# Patient Record
Sex: MALE | Race: WHITE | NOT HISPANIC OR LATINO | Employment: OTHER | URBAN - METROPOLITAN AREA
[De-identification: names, ages, dates, MRNs, and addresses within clinical notes are randomized per-mention and may not be internally consistent; named-entity substitution may affect disease eponyms.]

---

## 2023-03-31 DIAGNOSIS — Z00.00 ROUTINE GENERAL MEDICAL EXAMINATION AT A HEALTH CARE FACILITY: ICD-10-CM

## 2023-03-31 DIAGNOSIS — E78.5 ELEVATED LIPIDS: ICD-10-CM

## 2023-03-31 DIAGNOSIS — R74.8 ELEVATED LIVER ENZYMES: ICD-10-CM

## 2023-03-31 DIAGNOSIS — Z12.5 SCREENING PSA (PROSTATE SPECIFIC ANTIGEN): ICD-10-CM

## 2023-03-31 DIAGNOSIS — R09.89 LABILE BLOOD PRESSURE: ICD-10-CM

## 2023-03-31 DIAGNOSIS — E03.9 HYPOTHYROIDISM, UNSPECIFIED TYPE: ICD-10-CM

## 2023-03-31 DIAGNOSIS — R73.01 ELEVATED FASTING GLUCOSE: ICD-10-CM

## 2023-03-31 PROBLEM — M75.42 IMPINGEMENT SYNDROME OF LEFT SHOULDER: Status: ACTIVE | Noted: 2023-03-31

## 2023-03-31 PROBLEM — H61.21 IMPACTED CERUMEN OF RIGHT EAR: Status: RESOLVED | Noted: 2023-03-31 | Resolved: 2023-03-31

## 2023-03-31 PROBLEM — R00.2 PALPITATIONS: Status: ACTIVE | Noted: 2023-03-31

## 2023-03-31 PROBLEM — S90.222A SUBUNGUAL HEMATOMA OF TOE OF LEFT FOOT: Status: ACTIVE | Noted: 2023-03-31

## 2023-03-31 PROBLEM — I50.22 SYSTOLIC HEART FAILURE, CHRONIC (MULTI): Status: ACTIVE | Noted: 2023-03-31

## 2023-03-31 PROBLEM — I49.3 FREQUENT UNIFOCAL PVCS: Status: ACTIVE | Noted: 2023-03-31

## 2023-03-31 PROBLEM — D22.9 NEVUS: Status: ACTIVE | Noted: 2023-03-31

## 2023-03-31 PROBLEM — I72.3 ILIAC ARTERY ANEURYSM, BILATERAL (CMS-HCC): Status: ACTIVE | Noted: 2023-03-31

## 2023-03-31 PROBLEM — I42.9 CARDIOMYOPATHY (MULTI): Status: RESOLVED | Noted: 2023-03-31 | Resolved: 2023-03-31

## 2023-03-31 PROBLEM — I34.0 NONRHEUMATIC MITRAL VALVE REGURGITATION: Status: ACTIVE | Noted: 2023-03-31

## 2023-03-31 PROBLEM — H35.372 EPIRETINAL MEMBRANE, LEFT EYE: Status: ACTIVE | Noted: 2023-03-31

## 2023-03-31 PROBLEM — H60.91 OTITIS EXTERNA OF RIGHT EAR: Status: RESOLVED | Noted: 2023-03-31 | Resolved: 2023-03-31

## 2023-03-31 PROBLEM — L60.1 ONYCHOLYSIS OF TOENAIL: Status: ACTIVE | Noted: 2023-03-31

## 2023-03-31 PROBLEM — H25.13 AGE-RELATED NUCLEAR CATARACT OF BOTH EYES: Status: ACTIVE | Noted: 2023-03-31

## 2023-03-31 PROBLEM — D12.6 TUBULAR ADENOMA OF COLON: Status: ACTIVE | Noted: 2023-03-31

## 2023-03-31 PROBLEM — R06.02 SHORTNESS OF BREATH: Status: RESOLVED | Noted: 2023-03-31 | Resolved: 2023-03-31

## 2023-03-31 PROBLEM — M79.671 PAIN OF RIGHT HEEL: Status: RESOLVED | Noted: 2023-03-31 | Resolved: 2023-03-31

## 2023-03-31 PROBLEM — I42.0 CARDIOMYOPATHY, DILATED (MULTI): Status: ACTIVE | Noted: 2023-03-31

## 2023-03-31 PROBLEM — E78.00 HYPERCHOLESTEROLEMIA: Status: ACTIVE | Noted: 2023-03-31

## 2023-03-31 PROBLEM — E78.89 ELEVATED HIGH-DENSITY LIPOPROTEIN: Status: ACTIVE | Noted: 2023-03-31

## 2023-03-31 PROBLEM — I49.3 MULTIFOCAL PVCS: Status: RESOLVED | Noted: 2023-03-31 | Resolved: 2023-03-31

## 2023-03-31 RX ORDER — CARVEDILOL 3.12 MG/1
2 TABLET ORAL 2 TIMES DAILY
COMMUNITY
Start: 2022-11-17 | End: 2023-04-06 | Stop reason: ALTCHOICE

## 2023-03-31 RX ORDER — ASPIRIN 325 MG
1 TABLET, DELAYED RELEASE (ENTERIC COATED) ORAL DAILY
COMMUNITY
End: 2024-03-22 | Stop reason: ALTCHOICE

## 2023-03-31 RX ORDER — MULTIVITAMIN
1 TABLET ORAL DAILY
COMMUNITY

## 2023-03-31 RX ORDER — LISINOPRIL 5 MG/1
1 TABLET ORAL DAILY
COMMUNITY
Start: 2022-11-17 | End: 2024-04-17 | Stop reason: SDUPTHER

## 2023-03-31 RX ORDER — SPIRONOLACTONE 25 MG/1
1 TABLET ORAL DAILY
COMMUNITY
Start: 2022-11-17 | End: 2024-04-17 | Stop reason: SDUPTHER

## 2023-03-31 RX ORDER — LEVOTHYROXINE SODIUM 100 UG/1
1 TABLET ORAL DAILY
COMMUNITY
Start: 2013-09-23 | End: 2024-04-18

## 2023-04-03 ENCOUNTER — LAB (OUTPATIENT)
Dept: LAB | Facility: LAB | Age: 73
End: 2023-04-03
Payer: MEDICARE

## 2023-04-03 DIAGNOSIS — Z12.5 SCREENING PSA (PROSTATE SPECIFIC ANTIGEN): ICD-10-CM

## 2023-04-03 DIAGNOSIS — R74.8 ELEVATED LIVER ENZYMES: ICD-10-CM

## 2023-04-03 DIAGNOSIS — R73.01 ELEVATED FASTING GLUCOSE: ICD-10-CM

## 2023-04-03 DIAGNOSIS — Z00.00 ROUTINE GENERAL MEDICAL EXAMINATION AT A HEALTH CARE FACILITY: ICD-10-CM

## 2023-04-03 DIAGNOSIS — R09.89 LABILE BLOOD PRESSURE: ICD-10-CM

## 2023-04-03 DIAGNOSIS — E78.5 ELEVATED LIPIDS: ICD-10-CM

## 2023-04-03 DIAGNOSIS — E03.9 HYPOTHYROIDISM, UNSPECIFIED TYPE: ICD-10-CM

## 2023-04-03 LAB
ALANINE AMINOTRANSFERASE (SGPT) (U/L) IN SER/PLAS: 32 U/L (ref 10–52)
ALBUMIN (G/DL) IN SER/PLAS: 4 G/DL (ref 3.4–5)
ANION GAP IN SER/PLAS: 10 MMOL/L (ref 10–20)
APPEARANCE, URINE: CLEAR
ASPARTATE AMINOTRANSFERASE (SGOT) (U/L) IN SER/PLAS: 26 U/L (ref 9–39)
BASOPHILS (10*3/UL) IN BLOOD BY AUTOMATED COUNT: 0.04 X10E9/L (ref 0–0.1)
BASOPHILS/100 LEUKOCYTES IN BLOOD BY AUTOMATED COUNT: 1 % (ref 0–2)
BILIRUBIN, URINE: NEGATIVE
BLOOD, URINE: NEGATIVE
CALCIDIOL (25 OH VITAMIN D3) (NG/ML) IN SER/PLAS: 37 NG/ML
CALCIUM (MG/DL) IN SER/PLAS: 9.1 MG/DL (ref 8.6–10.6)
CARBON DIOXIDE, TOTAL (MMOL/L) IN SER/PLAS: 30 MMOL/L (ref 21–32)
CHLORIDE (MMOL/L) IN SER/PLAS: 101 MMOL/L (ref 98–107)
CHOLESTEROL (MG/DL) IN SER/PLAS: 243 MG/DL (ref 0–199)
CHOLESTEROL IN HDL (MG/DL) IN SER/PLAS: 63.7 MG/DL
CHOLESTEROL/HDL RATIO: 3.8
COLOR, URINE: YELLOW
CREATININE (MG/DL) IN SER/PLAS: 0.78 MG/DL (ref 0.5–1.3)
EOSINOPHILS (10*3/UL) IN BLOOD BY AUTOMATED COUNT: 0.12 X10E9/L (ref 0–0.4)
EOSINOPHILS/100 LEUKOCYTES IN BLOOD BY AUTOMATED COUNT: 2.9 % (ref 0–6)
ERYTHROCYTE DISTRIBUTION WIDTH (RATIO) BY AUTOMATED COUNT: 13.2 % (ref 11.5–14.5)
ERYTHROCYTE MEAN CORPUSCULAR HEMOGLOBIN CONCENTRATION (G/DL) BY AUTOMATED: 33.9 G/DL (ref 32–36)
ERYTHROCYTE MEAN CORPUSCULAR VOLUME (FL) BY AUTOMATED COUNT: 97 FL (ref 80–100)
ERYTHROCYTES (10*6/UL) IN BLOOD BY AUTOMATED COUNT: 3.99 X10E12/L (ref 4.5–5.9)
GFR MALE: >90 ML/MIN/1.73M2
GLUCOSE (MG/DL) IN SER/PLAS: 120 MG/DL (ref 74–99)
GLUCOSE, URINE: NEGATIVE MG/DL
HEMATOCRIT (%) IN BLOOD BY AUTOMATED COUNT: 38.7 % (ref 41–52)
HEMOGLOBIN (G/DL) IN BLOOD: 13.1 G/DL (ref 13.5–17.5)
IMMATURE GRANULOCYTES/100 LEUKOCYTES IN BLOOD BY AUTOMATED COUNT: 0.2 % (ref 0–0.9)
KETONES, URINE: NEGATIVE MG/DL
LDL: 155 MG/DL (ref 0–99)
LEUKOCYTE ESTERASE, URINE: NEGATIVE
LEUKOCYTES (10*3/UL) IN BLOOD BY AUTOMATED COUNT: 4.2 X10E9/L (ref 4.4–11.3)
LYMPHOCYTES (10*3/UL) IN BLOOD BY AUTOMATED COUNT: 1.39 X10E9/L (ref 0.8–3)
LYMPHOCYTES/100 LEUKOCYTES IN BLOOD BY AUTOMATED COUNT: 33 % (ref 13–44)
MONOCYTES (10*3/UL) IN BLOOD BY AUTOMATED COUNT: 0.39 X10E9/L (ref 0.05–0.8)
MONOCYTES/100 LEUKOCYTES IN BLOOD BY AUTOMATED COUNT: 9.3 % (ref 2–10)
NEUTROPHILS (10*3/UL) IN BLOOD BY AUTOMATED COUNT: 2.26 X10E9/L (ref 1.6–5.5)
NEUTROPHILS/100 LEUKOCYTES IN BLOOD BY AUTOMATED COUNT: 53.6 % (ref 40–80)
NITRITE, URINE: NEGATIVE
NRBC (PER 100 WBCS) BY AUTOMATED COUNT: 0 /100 WBC (ref 0–0)
PH, URINE: 7 (ref 5–8)
PHOSPHATE (MG/DL) IN SER/PLAS: 3.8 MG/DL (ref 2.5–4.9)
PLATELETS (10*3/UL) IN BLOOD AUTOMATED COUNT: 150 X10E9/L (ref 150–450)
POTASSIUM (MMOL/L) IN SER/PLAS: 4.9 MMOL/L (ref 3.5–5.3)
PROSTATE SPECIFIC AG (NG/ML) IN SER/PLAS: 0.75 NG/ML (ref 0–4)
PROTEIN, URINE: NEGATIVE MG/DL
SODIUM (MMOL/L) IN SER/PLAS: 136 MMOL/L (ref 136–145)
SPECIFIC GRAVITY, URINE: 1.01 (ref 1–1.03)
THYROTROPIN (MIU/L) IN SER/PLAS BY DETECTION LIMIT <= 0.05 MIU/L: 0.27 MIU/L (ref 0.44–3.98)
THYROXINE (T4) FREE (NG/DL) IN SER/PLAS: 1.72 NG/DL (ref 0.78–1.48)
TRIGLYCERIDE (MG/DL) IN SER/PLAS: 122 MG/DL (ref 0–149)
UREA NITROGEN (MG/DL) IN SER/PLAS: 14 MG/DL (ref 6–23)
UROBILINOGEN, URINE: <2 MG/DL (ref 0–1.9)
VLDL: 24 MG/DL (ref 0–40)

## 2023-04-03 PROCEDURE — 80061 LIPID PANEL: CPT

## 2023-04-03 PROCEDURE — 84450 TRANSFERASE (AST) (SGOT): CPT

## 2023-04-03 PROCEDURE — 81003 URINALYSIS AUTO W/O SCOPE: CPT

## 2023-04-03 PROCEDURE — 84443 ASSAY THYROID STIM HORMONE: CPT

## 2023-04-03 PROCEDURE — 84460 ALANINE AMINO (ALT) (SGPT): CPT

## 2023-04-03 PROCEDURE — 82306 VITAMIN D 25 HYDROXY: CPT

## 2023-04-03 PROCEDURE — 85025 COMPLETE CBC W/AUTO DIFF WBC: CPT

## 2023-04-03 PROCEDURE — G0103 PSA SCREENING: HCPCS

## 2023-04-03 PROCEDURE — 84439 ASSAY OF FREE THYROXINE: CPT

## 2023-04-03 PROCEDURE — 80069 RENAL FUNCTION PANEL: CPT

## 2023-04-03 PROCEDURE — 36415 COLL VENOUS BLD VENIPUNCTURE: CPT

## 2023-04-06 ENCOUNTER — OFFICE VISIT (OUTPATIENT)
Dept: PRIMARY CARE | Facility: CLINIC | Age: 73
End: 2023-04-06
Payer: MEDICARE

## 2023-04-06 ENCOUNTER — DOCUMENTATION (OUTPATIENT)
Dept: PRIMARY CARE | Facility: CLINIC | Age: 73
End: 2023-04-06

## 2023-04-06 DIAGNOSIS — I51.7 LAE (LEFT ATRIAL ENLARGEMENT): ICD-10-CM

## 2023-04-06 DIAGNOSIS — D12.6 TUBULAR ADENOMA OF COLON: ICD-10-CM

## 2023-04-06 DIAGNOSIS — E78.89 ELEVATED HIGH-DENSITY LIPOPROTEIN: ICD-10-CM

## 2023-04-06 DIAGNOSIS — R73.01 ELEVATED FASTING GLUCOSE: ICD-10-CM

## 2023-04-06 DIAGNOSIS — E78.5 ELEVATED LIPIDS: ICD-10-CM

## 2023-04-06 DIAGNOSIS — I42.0 CARDIOMYOPATHY, DILATED (MULTI): ICD-10-CM

## 2023-04-06 DIAGNOSIS — I34.0 NONRHEUMATIC MITRAL VALVE REGURGITATION: ICD-10-CM

## 2023-04-06 DIAGNOSIS — E03.8 OTHER SPECIFIED HYPOTHYROIDISM: ICD-10-CM

## 2023-04-06 DIAGNOSIS — Z00.00 ROUTINE GENERAL MEDICAL EXAMINATION AT A HEALTH CARE FACILITY: Primary | ICD-10-CM

## 2023-04-06 DIAGNOSIS — I49.3 FREQUENT UNIFOCAL PVCS: ICD-10-CM

## 2023-04-06 DIAGNOSIS — I50.22 SYSTOLIC HEART FAILURE, CHRONIC (MULTI): ICD-10-CM

## 2023-04-06 DIAGNOSIS — I50.22 CHRONIC SYSTOLIC (CONGESTIVE) HEART FAILURE (MULTI): ICD-10-CM

## 2023-04-06 PROCEDURE — G0439 PPPS, SUBSEQ VISIT: HCPCS | Performed by: INTERNAL MEDICINE

## 2023-04-06 PROCEDURE — 1170F FXNL STATUS ASSESSED: CPT | Performed by: INTERNAL MEDICINE

## 2023-04-06 PROCEDURE — 1159F MED LIST DOCD IN RCRD: CPT | Performed by: INTERNAL MEDICINE

## 2023-04-06 PROCEDURE — 1036F TOBACCO NON-USER: CPT | Performed by: INTERNAL MEDICINE

## 2023-04-06 PROCEDURE — 99214 OFFICE O/P EST MOD 30 MIN: CPT | Performed by: INTERNAL MEDICINE

## 2023-04-06 PROCEDURE — 1160F RVW MEDS BY RX/DR IN RCRD: CPT | Performed by: INTERNAL MEDICINE

## 2023-04-06 RX ORDER — LEVOTHYROXINE SODIUM 100 UG/1
100 TABLET ORAL
COMMUNITY
End: 2024-03-22 | Stop reason: ALTCHOICE

## 2023-04-06 RX ORDER — CARVEDILOL 12.5 MG/1
1 TABLET ORAL 2 TIMES DAILY
COMMUNITY
Start: 2023-02-10 | End: 2024-04-17 | Stop reason: SDUPTHER

## 2023-04-06 ASSESSMENT — ACTIVITIES OF DAILY LIVING (ADL)
BATHING: INDEPENDENT
DRESSING: INDEPENDENT

## 2023-04-06 ASSESSMENT — PATIENT HEALTH QUESTIONNAIRE - PHQ9
1. LITTLE INTEREST OR PLEASURE IN DOING THINGS: NOT AT ALL
SUM OF ALL RESPONSES TO PHQ9 QUESTIONS 1 AND 2: 0
2. FEELING DOWN, DEPRESSED OR HOPELESS: NOT AT ALL

## 2023-04-06 ASSESSMENT — ENCOUNTER SYMPTOMS
OCCASIONAL FEELINGS OF UNSTEADINESS: 0
DEPRESSION: 0

## 2023-04-06 NOTE — PROGRESS NOTES
Subjective   Reason for Visit: Wei Fitzgerald is an 72 y.o. male here for a Medicare Wellness visit.          Reviewed all medications by prescribing practitioner or clinical pharmacist (such as prescriptions, OTCs, herbal therapies and supplements) and documented in the medical record.    HPI    Patient Care Team:  Stan Perales MD as PCP - General  Stan Perales MD as PCP [place] [facility] [place]     Review of Systems    Objective   Vitals:  There were no vitals taken for this visit.      Physical Exam    Assessment/Plan   Problem List Items Addressed This Visit    None

## 2023-04-22 DIAGNOSIS — E03.9 HYPOTHYROIDISM, UNSPECIFIED TYPE: Primary | ICD-10-CM

## 2023-04-24 RX ORDER — LEVOTHYROXINE SODIUM 100 UG/1
TABLET ORAL
Qty: 90 TABLET | Refills: 3 | Status: SHIPPED | OUTPATIENT
Start: 2023-04-24 | End: 2024-03-22 | Stop reason: WASHOUT

## 2023-08-08 VITALS
SYSTOLIC BLOOD PRESSURE: 140 MMHG | WEIGHT: 160 LBS | RESPIRATION RATE: 16 BRPM | HEART RATE: 72 BPM | BODY MASS INDEX: 25.11 KG/M2 | HEIGHT: 67 IN | DIASTOLIC BLOOD PRESSURE: 70 MMHG

## 2023-08-08 PROBLEM — Z00.00 ROUTINE GENERAL MEDICAL EXAMINATION AT A HEALTH CARE FACILITY: Status: ACTIVE | Noted: 2023-08-08

## 2023-08-08 ASSESSMENT — ENCOUNTER SYMPTOMS
DYSPHORIC MOOD: 0
SLEEP DISTURBANCE: 0
RESPIRATORY NEGATIVE: 1
CARDIOVASCULAR NEGATIVE: 1
NEUROLOGICAL NEGATIVE: 1
ENDOCRINE NEGATIVE: 1
WEAKNESS: 0
HEADACHES: 0
NERVOUS/ANXIOUS: 0
DIZZINESS: 0
MUSCULOSKELETAL NEGATIVE: 1
SPEECH DIFFICULTY: 0
SORE THROAT: 0
GASTROINTESTINAL NEGATIVE: 1
TROUBLE SWALLOWING: 0
EYES NEGATIVE: 1
NUMBNESS: 0
CONFUSION: 0
HEMATOLOGIC/LYMPHATIC NEGATIVE: 1
VOICE CHANGE: 0

## 2023-08-08 ASSESSMENT — PATIENT HEALTH QUESTIONNAIRE - PHQ9
2. FEELING DOWN, DEPRESSED OR HOPELESS: NOT AT ALL
1. LITTLE INTEREST OR PLEASURE IN DOING THINGS: NOT AT ALL
SUM OF ALL RESPONSES TO PHQ9 QUESTIONS 1 AND 2: 0

## 2023-08-08 NOTE — PROGRESS NOTES
Subjective   Patient ID: Wei Fitzgerald is a 72 y.o. male who presents for Medicare Annual Wellness Visit Subsequent.  HPI  He feels well overall  Stable heart condition    Review of Systems   Constitutional:         Sleep 8 hours, weight 161 pounds stable     HENT:  Positive for tinnitus. Negative for dental problem, ear pain, hearing loss, sore throat, trouble swallowing and voice change.         Right ear buzz/ringing  Regular dental care  ENT consult 8/25/2022   Eyes: Negative.  Negative for visual disturbance.        Up to date   Respiratory: Negative.     Cardiovascular: Negative.    Gastrointestinal: Negative.         Mild flatus   Endocrine: Negative.    Genitourinary: Negative.    Musculoskeletal: Negative.  Negative for gait problem.   Skin: Negative.    Neurological: Negative.  Negative for dizziness, speech difficulty, weakness, numbness and headaches.   Hematological: Negative.    Psychiatric/Behavioral:  Negative for confusion, dysphoric mood and sleep disturbance. The patient is not nervous/anxious.        Objective   Physical Exam  Vitals reviewed.   Constitutional:       General: He is not in acute distress.     Appearance: Normal appearance. He is normal weight.   HENT:      Head: Normocephalic.      Right Ear: Tympanic membrane normal.      Left Ear: Tympanic membrane normal.      Ears:      Comments: Hearing intact to speech and finger rub  External surgical scar right ear     Nose: Nose normal.      Mouth/Throat:      Mouth: Mucous membranes are moist.      Pharynx: Oropharynx is clear.      Comments: Few missing teeth  Eyes:      General: No scleral icterus.     Extraocular Movements: Extraocular movements intact.      Conjunctiva/sclera: Conjunctivae normal.   Neck:      Vascular: No carotid bruit.      Comments: No JVD or thyromegaly  Cardiovascular:      Rate and Rhythm: Normal rate and regular rhythm.      Pulses: Normal pulses.      Heart sounds: Normal heart sounds. No murmur  "heard.  Pulmonary:      Effort: Pulmonary effort is normal.      Breath sounds: Normal breath sounds.   Abdominal:      General: Abdomen is flat. Bowel sounds are normal. There is no distension.      Palpations: Abdomen is soft. There is no mass.      Tenderness: There is no abdominal tenderness.      Hernia: No hernia is present.   Genitourinary:     Penis: Normal.       Testes: Normal.      Prostate: Normal.      Rectum: Normal.   Musculoskeletal:         General: Normal range of motion.      Cervical back: Normal range of motion and neck supple. No tenderness.   Skin:     General: Skin is warm.      Capillary Refill: Capillary refill takes less than 2 seconds.      Comments: Port wine stain left lower forearm   Neurological:      General: No focal deficit present.      Mental Status: He is alert and oriented to person, place, and time.      Cranial Nerves: No cranial nerve deficit.      Sensory: No sensory deficit.      Motor: No weakness.      Coordination: Coordination normal.      Gait: Gait normal.      Deep Tendon Reflexes: Reflexes normal.      Comments: Right-handed   Psychiatric:         Mood and Affect: Mood normal.         Behavior: Behavior normal.         Thought Content: Thought content normal.       /70   Pulse 72   Resp 16   Ht 1.702 m (5' 7\")   Wt 72.6 kg (160 lb)   BMI 25.06 kg/m²     Data  Lab 4/3/2023 glucose equals 120, GFR 90, total cholesterol 243, HDL 64, ; TSH 0.37, free T4 1.7  Cardiology consult 2/24/2023 reviewed  Echocardiogram 11/3/2022-normal sinus rhythm with frequent unifocal PVCs  Echocardiogram left atrial enlargement mitral valve regurgitation 2/7/2023  CONCLUSIONS:   1. Left ventricular systolic function is severely decreased with a 30-35% estimated ejection fraction.   2. Spectral Doppler shows an impaired relaxation pattern of left ventricular diastolic filling.   3. The left atrium is severely dilated.   4. Mild aortic valve stenosis.   5. Left ventricular " cavity size is moderately dilated.   6. There is global hypokinesis of the left ventricle with minor regional variations.  Coronary artery calcium CT score near 0 in 2017  Abdominal ultrasound October 2017 negative for aneurysm, 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  Colonoscopy 2017 diverticulosis and colon polyps  Stress echocardiogram 2005 normal    Assessment/Plan     1 overall you are in good health today, age and gender appropriate wellness services reviewed  2 you have no clinical evidence of active heart disease by history exam with indications of left atrial enlargement, cardiomyopathy or weakened heart muscle, aortic valve stenosis, and premature ventricular contractions  Recommend increase lisinopril to 10 mg for afterload reduction  Consider cholesterol therapy  Continue current therapy and cardiology follow-up  No abdominal aortic aneurysm but 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  Cardiac risk counseling  3 age and gender appropriate cancer screening and prevention reviewed, consider repeat colonoscopy  4 Immunizations reviewed, declined  5 hypothyroidism-suggest decrease Synthroid to 88 mcg daily  Problem List Items Addressed This Visit       Cardiomyopathy, dilated (CMS/HCC)    Relevant Medications    carvedilol (Coreg) 12.5 mg tablet    Other Relevant Orders    Lipid panel    Elevated fasting glucose - Primary    Relevant Orders    Hemoglobin A1c    Renal function panel    Elevated high-density lipoprotein    Elevated lipids    Hypothyroidism    Relevant Orders    Tsh With Reflex To Free T4 If Abnormal    Frequent unifocal PVCs    Relevant Medications    carvedilol (Coreg) 12.5 mg tablet    Nonrheumatic mitral valve regurgitation    Relevant Medications    carvedilol (Coreg) 12.5 mg tablet    Systolic heart failure, chronic (CMS/HCC)    Relevant Medications    carvedilol (Coreg) 12.5 mg tablet    Tubular adenoma of colon     Other Visit Diagnoses       LAE (left atrial enlargement)        Relevant Medications     carvedilol (Coreg) 12.5 mg tablet    Chronic systolic (congestive) heart failure (CMS/HCC)        Relevant Medications    carvedilol (Coreg) 12.5 mg tablet    Other Relevant Orders    Lipid panel

## 2023-09-07 ENCOUNTER — PATIENT MESSAGE (OUTPATIENT)
Dept: PRIMARY CARE | Facility: CLINIC | Age: 73
End: 2023-09-07

## 2023-10-26 ENCOUNTER — TELEPHONE (OUTPATIENT)
Dept: CARDIOLOGY | Facility: CLINIC | Age: 73
End: 2023-10-26
Payer: MEDICARE

## 2023-10-26 NOTE — TELEPHONE ENCOUNTER
Patient called to see if lab order was put in for his lipid panel. Please advise and I will call him back.

## 2023-10-30 ENCOUNTER — LAB (OUTPATIENT)
Dept: LAB | Facility: LAB | Age: 73
End: 2023-10-30
Payer: MEDICARE

## 2023-10-30 DIAGNOSIS — R73.01 ELEVATED FASTING GLUCOSE: ICD-10-CM

## 2023-10-30 DIAGNOSIS — I50.22 CHRONIC SYSTOLIC (CONGESTIVE) HEART FAILURE (MULTI): ICD-10-CM

## 2023-10-30 DIAGNOSIS — I42.0 CARDIOMYOPATHY, DILATED (MULTI): ICD-10-CM

## 2023-10-30 DIAGNOSIS — E03.8 OTHER SPECIFIED HYPOTHYROIDISM: ICD-10-CM

## 2023-10-30 LAB
ALBUMIN SERPL BCP-MCNC: 4 G/DL (ref 3.4–5)
ANION GAP SERPL CALC-SCNC: 12 MMOL/L (ref 10–20)
BUN SERPL-MCNC: 13 MG/DL (ref 6–23)
CALCIUM SERPL-MCNC: 9.2 MG/DL (ref 8.6–10.6)
CHLORIDE SERPL-SCNC: 101 MMOL/L (ref 98–107)
CHOLEST SERPL-MCNC: 231 MG/DL (ref 0–199)
CHOLESTEROL/HDL RATIO: 3.6
CO2 SERPL-SCNC: 28 MMOL/L (ref 21–32)
CREAT SERPL-MCNC: 0.72 MG/DL (ref 0.5–1.3)
EST. AVERAGE GLUCOSE BLD GHB EST-MCNC: 103 MG/DL
GFR SERPL CREATININE-BSD FRML MDRD: >90 ML/MIN/1.73M*2
GLUCOSE SERPL-MCNC: 112 MG/DL (ref 74–99)
HBA1C MFR BLD: 5.2 %
HDLC SERPL-MCNC: 64.2 MG/DL
LDLC SERPL CALC-MCNC: 146 MG/DL
NON HDL CHOLESTEROL: 167 MG/DL (ref 0–149)
PHOSPHATE SERPL-MCNC: 3.6 MG/DL (ref 2.5–4.9)
POTASSIUM SERPL-SCNC: 4.6 MMOL/L (ref 3.5–5.3)
SODIUM SERPL-SCNC: 136 MMOL/L (ref 136–145)
T4 FREE SERPL-MCNC: 1.65 NG/DL (ref 0.78–1.48)
TRIGL SERPL-MCNC: 106 MG/DL (ref 0–149)
TSH SERPL-ACNC: 0.22 MIU/L (ref 0.44–3.98)
VLDL: 21 MG/DL (ref 0–40)

## 2023-10-30 PROCEDURE — 80069 RENAL FUNCTION PANEL: CPT

## 2023-10-30 PROCEDURE — 36415 COLL VENOUS BLD VENIPUNCTURE: CPT

## 2023-10-30 PROCEDURE — 80061 LIPID PANEL: CPT

## 2023-10-30 PROCEDURE — 84439 ASSAY OF FREE THYROXINE: CPT

## 2023-10-30 PROCEDURE — 84443 ASSAY THYROID STIM HORMONE: CPT

## 2023-10-30 PROCEDURE — 83036 HEMOGLOBIN GLYCOSYLATED A1C: CPT

## 2024-03-13 ENCOUNTER — APPOINTMENT (OUTPATIENT)
Dept: CARDIOLOGY | Facility: CLINIC | Age: 74
End: 2024-03-13
Payer: MEDICARE

## 2024-03-22 ENCOUNTER — OFFICE VISIT (OUTPATIENT)
Dept: CARDIOLOGY | Facility: CLINIC | Age: 74
End: 2024-03-22
Payer: MEDICARE

## 2024-03-22 VITALS
OXYGEN SATURATION: 95 % | DIASTOLIC BLOOD PRESSURE: 84 MMHG | HEART RATE: 79 BPM | SYSTOLIC BLOOD PRESSURE: 126 MMHG | WEIGHT: 170 LBS | HEIGHT: 67 IN | BODY MASS INDEX: 26.68 KG/M2

## 2024-03-22 DIAGNOSIS — I50.22 SYSTOLIC HEART FAILURE, CHRONIC (MULTI): ICD-10-CM

## 2024-03-22 DIAGNOSIS — E03.9 HYPOTHYROIDISM, UNSPECIFIED TYPE: ICD-10-CM

## 2024-03-22 DIAGNOSIS — I42.0 CARDIOMYOPATHY, DILATED (MULTI): Primary | ICD-10-CM

## 2024-03-22 PROCEDURE — 1126F AMNT PAIN NOTED NONE PRSNT: CPT | Performed by: INTERNAL MEDICINE

## 2024-03-22 PROCEDURE — 1159F MED LIST DOCD IN RCRD: CPT | Performed by: INTERNAL MEDICINE

## 2024-03-22 PROCEDURE — 1036F TOBACCO NON-USER: CPT | Performed by: INTERNAL MEDICINE

## 2024-03-22 PROCEDURE — 99214 OFFICE O/P EST MOD 30 MIN: CPT | Performed by: INTERNAL MEDICINE

## 2024-03-22 ASSESSMENT — ENCOUNTER SYMPTOMS
OCCASIONAL FEELINGS OF UNSTEADINESS: 0
DEPRESSION: 1
LOSS OF SENSATION IN FEET: 1

## 2024-03-22 ASSESSMENT — PAIN SCALES - GENERAL: PAINLEVEL: 0-NO PAIN

## 2024-03-22 NOTE — PROGRESS NOTES
Name : Wei Fitzgerald   : 1950   MRN : [id number]   ENC Date : 2024      Assessment and Plan:  Cardiomyopathy: Near normal LVEF.  NYHA class I.  Tolerating medications well.  Recommend no medication changes.  Patient inquired if he could stop his medications and I encouraged him to continue to take them to prevent worsening LV function.  Patient does not meet criteria for implantable defibrillator.  Patient has not had an ischemic workup in the past however he has never had chest discomfort his LVEF improved.  His EKG does not suggest prior infarct.  His echocardiogram showed global cardiomyopathy and his coronary calcium score was only 5 in 2017.  At this point I think we can continue to manage him as a nonischemic cardiomyopathy.  Disp: RTO in 6 months    HPI:  Patient returns today feeling quite well.  His blood pressures at home are excellent.  He reports no lower extremity edema.  No dyspnea with exertion.  His main complaint is fatigue and tiredness in the middle of the day.  His home life is somewhat stressful.  No syncopal events.  No palpitations.  No TIA or CVA-like symptoms.    Problem list overview:   Patient Active Problem List   Diagnosis    Age-related nuclear cataract of both eyes    Cardiomyopathy, dilated (CMS/HCC)    Elevated fasting glucose    Elevated high-density lipoprotein    Elevated lipids    Epiretinal membrane, left eye    Hypercholesterolemia    Hypothyroidism    Iliac artery aneurysm, bilateral (CMS/HCC)    Impingement syndrome of left shoulder    Labile blood pressure    Frequent unifocal PVCs    Nevus    Nonrheumatic mitral valve regurgitation    Onycholysis of toenail    Palpitations    Subungual hematoma of toe of left foot    Systolic heart failure, chronic (CMS/HCC)    Tubular adenoma of colon    Elevated liver enzymes    Routine general medical examination at a health care facility       Meds:   Current Outpatient Medications on File Prior to Visit   Medication Sig  "Dispense Refill    carvedilol (Coreg) 12.5 mg tablet Take 1 tablet (12.5 mg) by mouth 2 times a day.      levothyroxine (Synthroid, Levoxyl) 100 mcg tablet Take 1 tablet (100 mcg) by mouth once daily.      lisinopril 5 mg tablet Take 1 tablet (5 mg) by mouth once daily.      multivitamin (Daily Multi-Vitamin) tablet Take 1 tablet by mouth once daily.      spironolactone (Aldactone) 25 mg tablet Take 1 tablet (25 mg) by mouth once daily.      [DISCONTINUED] levothyroxine (Synthroid, Levoxyl) 100 mcg tablet Take 1 tablet (100 mcg) by mouth once daily.      [DISCONTINUED] levothyroxine (Synthroid, Levoxyl) 100 mcg tablet TAKE 1 TABLET BY MOUTH EVERY DAY (Patient not taking: Reported on 3/22/2024) 90 tablet 3    [DISCONTINUED] omega-3 (Fish OiL) 60- mg capsule Take 1 capsule (500 mg) by mouth once daily.       No current facility-administered medications on file prior to visit.        VS:  /84 (BP Location: Left arm, Patient Position: Sitting)   Pulse 79   Ht 1.702 m (5' 7\")   Wt 77.1 kg (170 lb)   SpO2 95%   BMI 26.63 kg/m²     Vitals reviewed.   Neck:      Vascular: No JVD.   Pulmonary:      Breath sounds: Normal breath sounds.   Cardiovascular:      Normal rate. Regular rhythm.      Murmurs: There is no murmur.      No gallop.    Edema:     Peripheral edema absent.   Abdominal:      General: Abdomen is flat.      Palpations: Abdomen is soft.   Skin:     General: Skin is warm.         Ramon Mendez MD  "

## 2024-04-03 ENCOUNTER — TELEPHONE (OUTPATIENT)
Dept: PRIMARY CARE | Facility: CLINIC | Age: 74
End: 2024-04-03
Payer: MEDICARE

## 2024-04-05 DIAGNOSIS — I42.0 CARDIOMYOPATHY, DILATED (MULTI): ICD-10-CM

## 2024-04-05 DIAGNOSIS — Z00.00 ROUTINE GENERAL MEDICAL EXAMINATION AT A HEALTH CARE FACILITY: Primary | ICD-10-CM

## 2024-04-05 DIAGNOSIS — I50.22 CHRONIC SYSTOLIC HEART FAILURE (MULTI): ICD-10-CM

## 2024-04-05 DIAGNOSIS — E03.9 HYPOTHYROIDISM, UNSPECIFIED TYPE: ICD-10-CM

## 2024-04-05 DIAGNOSIS — I50.22 SYSTOLIC HEART FAILURE, CHRONIC (MULTI): ICD-10-CM

## 2024-04-08 ENCOUNTER — OFFICE VISIT (OUTPATIENT)
Dept: PRIMARY CARE | Facility: CLINIC | Age: 74
End: 2024-04-08
Payer: MEDICARE

## 2024-04-08 ENCOUNTER — APPOINTMENT (OUTPATIENT)
Dept: PRIMARY CARE | Facility: CLINIC | Age: 74
End: 2024-04-08
Payer: MEDICARE

## 2024-04-08 ENCOUNTER — LAB (OUTPATIENT)
Dept: LAB | Facility: LAB | Age: 74
End: 2024-04-08
Payer: MEDICARE

## 2024-04-08 VITALS
HEART RATE: 68 BPM | DIASTOLIC BLOOD PRESSURE: 74 MMHG | SYSTOLIC BLOOD PRESSURE: 120 MMHG | BODY MASS INDEX: 26.84 KG/M2 | WEIGHT: 171 LBS | HEIGHT: 67 IN | RESPIRATION RATE: 16 BRPM

## 2024-04-08 DIAGNOSIS — I72.3 ANEURYSM OF ILIAC ARTERY (CMS-HCC): ICD-10-CM

## 2024-04-08 DIAGNOSIS — Z00.00 ROUTINE GENERAL MEDICAL EXAMINATION AT HEALTH CARE FACILITY: ICD-10-CM

## 2024-04-08 DIAGNOSIS — G89.29 CHRONIC PAIN OF LEFT KNEE: ICD-10-CM

## 2024-04-08 DIAGNOSIS — I50.22 CHRONIC SYSTOLIC HEART FAILURE (MULTI): ICD-10-CM

## 2024-04-08 DIAGNOSIS — Z00.00 ROUTINE GENERAL MEDICAL EXAMINATION AT A HEALTH CARE FACILITY: ICD-10-CM

## 2024-04-08 DIAGNOSIS — E03.9 HYPOTHYROIDISM, UNSPECIFIED TYPE: ICD-10-CM

## 2024-04-08 DIAGNOSIS — E78.89 ELEVATED HIGH-DENSITY LIPOPROTEIN: ICD-10-CM

## 2024-04-08 DIAGNOSIS — D12.6 TUBULAR ADENOMA OF COLON: ICD-10-CM

## 2024-04-08 DIAGNOSIS — R53.82 CHRONIC FATIGUE: ICD-10-CM

## 2024-04-08 DIAGNOSIS — I42.0 CARDIOMYOPATHY, DILATED (MULTI): ICD-10-CM

## 2024-04-08 DIAGNOSIS — I35.0 NONRHEUMATIC AORTIC VALVE STENOSIS: ICD-10-CM

## 2024-04-08 DIAGNOSIS — M25.562 CHRONIC PAIN OF LEFT KNEE: ICD-10-CM

## 2024-04-08 DIAGNOSIS — I34.0 NONRHEUMATIC MITRAL VALVE REGURGITATION: ICD-10-CM

## 2024-04-08 DIAGNOSIS — Z00.00 ROUTINE GENERAL MEDICAL EXAMINATION AT A HEALTH CARE FACILITY: Primary | ICD-10-CM

## 2024-04-08 DIAGNOSIS — I51.7 LAE (LEFT ATRIAL ENLARGEMENT): ICD-10-CM

## 2024-04-08 PROBLEM — E25.0: Status: ACTIVE | Noted: 2024-04-08

## 2024-04-08 LAB
ALBUMIN SERPL BCP-MCNC: 4.2 G/DL (ref 3.4–5)
ALT SERPL W P-5'-P-CCNC: 23 U/L (ref 10–52)
ANION GAP SERPL CALC-SCNC: 10 MMOL/L (ref 10–20)
APPEARANCE UR: CLEAR
AST SERPL W P-5'-P-CCNC: 19 U/L (ref 9–39)
BASOPHILS # BLD AUTO: 0.04 X10*3/UL (ref 0–0.1)
BASOPHILS NFR BLD AUTO: 0.9 %
BILIRUB UR STRIP.AUTO-MCNC: NEGATIVE MG/DL
BUN SERPL-MCNC: 11 MG/DL (ref 6–23)
CALCIUM SERPL-MCNC: 9.5 MG/DL (ref 8.6–10.6)
CHLORIDE SERPL-SCNC: 103 MMOL/L (ref 98–107)
CHOLEST SERPL-MCNC: 234 MG/DL (ref 0–199)
CHOLESTEROL/HDL RATIO: 3.6
CO2 SERPL-SCNC: 29 MMOL/L (ref 21–32)
COLOR UR: NORMAL
CREAT SERPL-MCNC: 0.78 MG/DL (ref 0.5–1.3)
EGFRCR SERPLBLD CKD-EPI 2021: >90 ML/MIN/1.73M*2
EOSINOPHIL # BLD AUTO: 0.06 X10*3/UL (ref 0–0.4)
EOSINOPHIL NFR BLD AUTO: 1.3 %
ERYTHROCYTE [DISTWIDTH] IN BLOOD BY AUTOMATED COUNT: 12.7 % (ref 11.5–14.5)
EST. AVERAGE GLUCOSE BLD GHB EST-MCNC: 103 MG/DL
GLUCOSE SERPL-MCNC: 107 MG/DL (ref 74–99)
GLUCOSE UR STRIP.AUTO-MCNC: NORMAL MG/DL
HBA1C MFR BLD: 5.2 %
HCT VFR BLD AUTO: 43.1 % (ref 41–52)
HCV AB SER QL: NONREACTIVE
HDLC SERPL-MCNC: 64.3 MG/DL
HGB BLD-MCNC: 14.4 G/DL (ref 13.5–17.5)
IMM GRANULOCYTES # BLD AUTO: 0.01 X10*3/UL (ref 0–0.5)
IMM GRANULOCYTES NFR BLD AUTO: 0.2 % (ref 0–0.9)
KETONES UR STRIP.AUTO-MCNC: NEGATIVE MG/DL
LDLC SERPL CALC-MCNC: 149 MG/DL
LEUKOCYTE ESTERASE UR QL STRIP.AUTO: NEGATIVE
LYMPHOCYTES # BLD AUTO: 1.41 X10*3/UL (ref 0.8–3)
LYMPHOCYTES NFR BLD AUTO: 30.3 %
MCH RBC QN AUTO: 31.5 PG (ref 26–34)
MCHC RBC AUTO-ENTMCNC: 33.4 G/DL (ref 32–36)
MCV RBC AUTO: 94 FL (ref 80–100)
MONOCYTES # BLD AUTO: 0.37 X10*3/UL (ref 0.05–0.8)
MONOCYTES NFR BLD AUTO: 7.9 %
NEUTROPHILS # BLD AUTO: 2.77 X10*3/UL (ref 1.6–5.5)
NEUTROPHILS NFR BLD AUTO: 59.4 %
NITRITE UR QL STRIP.AUTO: NEGATIVE
NON HDL CHOLESTEROL: 170 MG/DL (ref 0–149)
NRBC BLD-RTO: 0 /100 WBCS (ref 0–0)
PH UR STRIP.AUTO: 7.5 [PH]
PHOSPHATE SERPL-MCNC: 2.9 MG/DL (ref 2.5–4.9)
PLATELET # BLD AUTO: 155 X10*3/UL (ref 150–450)
POTASSIUM SERPL-SCNC: 4.8 MMOL/L (ref 3.5–5.3)
PROT UR STRIP.AUTO-MCNC: NEGATIVE MG/DL
RBC # BLD AUTO: 4.57 X10*6/UL (ref 4.5–5.9)
RBC # UR STRIP.AUTO: NEGATIVE /UL
SODIUM SERPL-SCNC: 137 MMOL/L (ref 136–145)
SP GR UR STRIP.AUTO: 1.01
T4 FREE SERPL-MCNC: 1.73 NG/DL (ref 0.78–1.48)
TRIGL SERPL-MCNC: 106 MG/DL (ref 0–149)
TSH SERPL-ACNC: 0.4 MIU/L (ref 0.44–3.98)
UROBILINOGEN UR STRIP.AUTO-MCNC: NORMAL MG/DL
VLDL: 21 MG/DL (ref 0–40)
WBC # BLD AUTO: 4.7 X10*3/UL (ref 4.4–11.3)

## 2024-04-08 PROCEDURE — 83036 HEMOGLOBIN GLYCOSYLATED A1C: CPT

## 2024-04-08 PROCEDURE — 85025 COMPLETE CBC W/AUTO DIFF WBC: CPT

## 2024-04-08 PROCEDURE — 80061 LIPID PANEL: CPT

## 2024-04-08 PROCEDURE — 1170F FXNL STATUS ASSESSED: CPT | Performed by: INTERNAL MEDICINE

## 2024-04-08 PROCEDURE — 99214 OFFICE O/P EST MOD 30 MIN: CPT | Performed by: INTERNAL MEDICINE

## 2024-04-08 PROCEDURE — 86803 HEPATITIS C AB TEST: CPT

## 2024-04-08 PROCEDURE — 84460 ALANINE AMINO (ALT) (SGPT): CPT

## 2024-04-08 PROCEDURE — 1160F RVW MEDS BY RX/DR IN RCRD: CPT | Performed by: INTERNAL MEDICINE

## 2024-04-08 PROCEDURE — 84450 TRANSFERASE (AST) (SGOT): CPT

## 2024-04-08 PROCEDURE — 84402 ASSAY OF FREE TESTOSTERONE: CPT

## 2024-04-08 PROCEDURE — 84443 ASSAY THYROID STIM HORMONE: CPT

## 2024-04-08 PROCEDURE — 36415 COLL VENOUS BLD VENIPUNCTURE: CPT

## 2024-04-08 PROCEDURE — G0439 PPPS, SUBSEQ VISIT: HCPCS | Performed by: INTERNAL MEDICINE

## 2024-04-08 PROCEDURE — 1159F MED LIST DOCD IN RCRD: CPT | Performed by: INTERNAL MEDICINE

## 2024-04-08 PROCEDURE — 80069 RENAL FUNCTION PANEL: CPT

## 2024-04-08 PROCEDURE — 84439 ASSAY OF FREE THYROXINE: CPT

## 2024-04-08 PROCEDURE — 81003 URINALYSIS AUTO W/O SCOPE: CPT

## 2024-04-08 PROCEDURE — 93000 ELECTROCARDIOGRAM COMPLETE: CPT | Performed by: INTERNAL MEDICINE

## 2024-04-08 PROCEDURE — 1036F TOBACCO NON-USER: CPT | Performed by: INTERNAL MEDICINE

## 2024-04-08 NOTE — PROGRESS NOTES
"Subjective   Reason for Visit: Wei Fitzgerald is an 73 y.o. male here for a Medicare Wellness visit.     Past Medical, Surgical, and Family History reviewed and updated in chart.    Reviewed all medications by prescribing practitioner or clinical pharmacist (such as prescriptions, OTCs, herbal therapies and supplements) and documented in the medical record.    HPI  Candle burning down low energy   Normal daily activities   Depressed domestic/family ;  financial support   Friends loss death   100-120   no family history to report   [facility] visit 3/27/2022 and 4/6/2023    2-5 miles walking day   Sleep trouble falling reading consistent early awakening   Usual once   Diet -same     Dental OK \  Hearing ok no change   Eye due   12/22/21 pf c19  L knee Ptx  Nt in hands bed positional activity     Etoh 4 oz  Family History:   Heart Disease, Cancer+, Hypertension, Diabetes mellitus, Hyperlipidemia, Asthma,  Alcoholism or substance abuse, Depression/Suicide, Glaucoma   Mother dead 80s , natural causes   Father dead 76 prostate cancer    Brother(s) no   Sisters(s) no    Son(s) no   Daughter(s)no      Social History:   Birthplace: [facility] Education:3 yr college  :no   Marital Status:m x 33        Occupation: retired     Travel/Illness: [place] , [place]    Exposures (asbestos, radiation, radon, noise, etc.): thyroid    Lifestyle:    Diet limited fried food , low red meat , well balanced     Exercise + walks 2-4 miles daily     Ear protection from loud noise     Eye protection from projectiles (work, hobbies, sports)    Alcohol + daily vodka same 1.25 oz 3 - no health legal or social problems    Drugs -    Tobacco former , none x 21 yrs , 1-2 ppd x 5-10 yrs    Caffeine + coffee    Safety: guns-, seatbelts/airbags+30%, smoke detectors+    Sexual - aware of sexually transmitted disease and \"safe\" sexual practice     Skin protection from sun +    Swim +    Living " Will/Durable Power of  for Healthcare +    Cardiopulmonary resuscitation (CPR) training    Blood + or organ donor / Blood type     Patient Care Team:  Stan Perales MD as PCP - General  Stan Perales MD as PCP - [facility] [place]  Ramon Mendez MD as Consulting Physician (Cardiology)     Review of Systems  Weight Current 165 # Maximum  170s Desired  Change  In the past 6-12 weeks, have you experienced any significant ... Negative except for noted   Fever, chills, Night sweats, fatigue +, change in appetite, change in sleep-8 hours early awakening   Skin sore, rashes, itching, moles of concern-   Swollen or tender glands in neck, armpits, or groin   Unusually severe or prolonged headaches   Blurred, double, or loss of vision; last formal eye exam, with whom, reading glasses+/contacts-2022+   Loss or change in hearing, ear pain, ringing+constant buzz, or wax problem, hearing aid-right buzz   Nasal congestion, polyps, runny nose, sneezing   Sores in mouth, sore throat, change or trouble in chewing, speaking, swallowing, or voice   Dental or jaw problem, dentures, regular dental care with whom+ regular     Neck or back pain; swelling, redness, pain, or stiffness in joints; known arthritis   Chest pain/pressure or unusual shortness of breath with exertion, shortness of breath while lying down, swelling in legs, sensation of heart racing or skipping beats; unexplained nausea, or breaking into a cold sweat; feeling faint, lightheaded, or dizzy Unusual cough   Stomach or abdominal pain, nausea/vomiting, diarrhea/constipation, blood in or  dark black color to stool, other change in bowel habits-   Burning on urination, trouble getting stream started, frequent or nighttime urination+, blood in urine, loss of control of bladder; sex ok    Lumps or thickening in testicles or scrotum, sores or discharge from penis, swelling or hernias in groin   Weakness, numbness/tingling, especially on one side of the  "body or the other;  tremor, shakiness, loss of coordination, or falls; change in memory   Excessive or change in thirst, urination, or appetite; unusual sensitivity to the heat or cold   Unusual feelings of being blue, sad, down or depressed; stress, restlessness, tension or anxiety  Falls -     Objective   Vitals:  /74   Pulse 68   Resp 16   Ht 1.702 m (5' 7\")   Wt 77.6 kg (171 lb)   BMI 26.78 kg/m²       Physical Exam  General: well-developed, well-nourished middle-aged ambulatory white male in no acute distress  Head: normocephalic/atraumatic; temporal arteries intact, nontender; Temporalmandibular joints nontender without click  Eyes: EOMI, PERRLA, sclera white, conjunctiva pink, fundi not examined  Ears: Canals clear; TMs clear intact with normal landmarks, hearing intact to speech and finger rub; external surgical scar right ear; ear nontender  Nose: Nasal mucosa clear, pink, moist; no sinus tenderness  Oropharynx: Mucosa clear, pink, moist without lesions or exudate   Dentition intact good/ fair condition/repair, few missing teeth; right posterior upper tooth impaired   Tonsils atrophy/absent   Tongue midline, normal mucosa and protrusion   Normal motion of palate/uvula/pharynx  Neck: Supple, full range of motion; no lymphadenopathy; no JVD   Thyroid normal size, smooth to palpation/observation without nodules   Carotids normal pulse, upstroke without bruit  Back: Spine normal shape, curvature, flexibility, nontender   No CVA or SIJ tenderness  Chest: Normal symmetrical, full expansion with equal breath sounds without adventitious sounds; wall nontender  Cor: PMI normal; regular rate and rhythm; normal S1, S2 without adventitious sounds  Abdomen: Soft, nontender, benign without mass, hepatosplenomegaly, fluid   Bowels sounds present, normal  Genitourinary: Normal male phallus without lesion or exudate   Scrotum without masses; testes smooth, nontender   Inguinal canal without mass  Rectal: Normal " sphincter tone; no mass; stool formed, soft, brown, hemoccult negative   Prostate smooth, normal size, nontender, normal consistency  Extremities: Bones, Joints intact with FROM, T0 L0 S0   Pulses intact, symmetrical; no edema; venous system legs normal  Neurological: Mental status - alert, appropriate affect, normal orientation and conversational interaction; right handed dominant   Cranial Nerves: II-XII intact   Motor - 5/5 strength throughout muscle groups; normal tone, bulk   Sensory - intact symmetrical soft touch, sharp sense   DTRs - intact knee, ankle, brachial   Cerebellar - normal finger-nose; absent Rhomberg sign   Station/gait - intact, stable including toe, heel, and tandem   Integument: Skin - clear without rash or lesion, port-wine stain left forearm   Lymph - without cervical, axillary, or inguinal lymphadenopathy      Results/Data    Laboratory 10/30/2023 Free T4 =1.65, TSH 0.22  , HDL-C 64, ratio 3.6, LDL-C 146, glucose 112, creatinine 0.7 with GFR greater than 90  3/14/2022 satisfactory  January 2021 reviewed summary 2019 reviewed  December 2018 satisfactory with borderline high LDL cholesterol, high HDL cholesterol  PSA normal and less than last year  Coronary artery calcium CT score near 0 in 2017  Abdominal ultrasound - October 2017 for aneurysm aorta, 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  Laboratory October 2017 satisfactory with high HDL cholesterol  Cardiology consult 3/22/2024 reviewed  Orthopedic consult 5/24/2023 reviewed  Electrocardiogram sinus bradycardia/normal sinus rhythm 4/8/2024  normal sinus rhythm with isolated uniform PVCs 3/17/22  Echocardiogram 8/23/2023-LVEF 45%, left atrium severely dilated, mild aortic valve stenosis; LV function has improved from 2/7/2023 from 30-35%  X-ray left knee 5/15/2023 mild degenerative changes  Colonoscopy October 2017 diverticulosis and colon polyps including tubular adenoma  Stress echocardiogram 2005 normal      Assessment/Plan   1 overall  you are in good health today, age and gender appropriate wellness services reviewed  2 you have no clinical evidence of active heart disease by history exam with indications of left atrial enlargement, cardiomyopathy or weakened heart muscle, aortic valve stenosis, and premature ventricular contractions  Entresto for afterload reduction  Consider cholesterol therapy  Continue current therapy and cardiology follow-up reviewed  Cardiac risk counseling  3 age and gender appropriate cancer screening and prevention reviewed, recommend follow-up colonoscopy  4 Immunizations available reviewed, declined  5 hypothyroidism-recheck levels    Problem List Items Addressed This Visit       Cardiomyopathy (Multi)    Elevated high-density lipoprotein    Hypothyroid    Aneurysm of iliac artery (CMS-HCC)    Nonrheumatic mitral valve regurgitation    Tubular adenoma of colon    Routine general medical examination at a health care facility - Primary    Chronic fatigue    Relevant Orders    Testosterone, total and free (Completed)    LAE (left atrial enlargement)    Nonrheumatic aortic valve stenosis    Knee pain, left

## 2024-04-08 NOTE — PATIENT INSTRUCTIONS
1 overall you are in good health today, age and gender appropriate wellness services reviewed  2 you have no clinical evidence of active heart disease by history exam with indications of left atrial enlargement, cardiomyopathy or weakened heart muscle, aortic valve stenosis, and premature ventricular contractions  Entresto for afterload reduction  Consider cholesterol therapy  Continue current therapy and cardiology follow-up reviewed  Cardiac risk counseling  3 age and gender appropriate cancer screening and prevention reviewed  4 Immunizations reviewed, declined  5 hypothyroidism-recheck levels

## 2024-04-10 ENCOUNTER — PATIENT MESSAGE (OUTPATIENT)
Dept: PRIMARY CARE | Facility: CLINIC | Age: 74
End: 2024-04-10
Payer: MEDICARE

## 2024-04-12 LAB
TESTOSTERONE FREE (CHAN): 68.1 PG/ML (ref 30–135)
TESTOSTERONE,TOTAL,LC-MS/MS: 577 NG/DL (ref 250–1100)

## 2024-04-17 DIAGNOSIS — I42.0 DILATED CARDIOMYOPATHY (MULTI): ICD-10-CM

## 2024-04-17 DIAGNOSIS — I50.22 SYSTOLIC HEART FAILURE, CHRONIC (MULTI): ICD-10-CM

## 2024-04-17 RX ORDER — LISINOPRIL 5 MG/1
5 TABLET ORAL DAILY
Qty: 90 TABLET | Refills: 3 | Status: SHIPPED | OUTPATIENT
Start: 2024-04-17

## 2024-04-17 RX ORDER — CARVEDILOL 12.5 MG/1
12.5 TABLET ORAL 2 TIMES DAILY
Qty: 180 TABLET | Refills: 3 | Status: SHIPPED | OUTPATIENT
Start: 2024-04-17

## 2024-04-17 RX ORDER — SPIRONOLACTONE 25 MG/1
25 TABLET ORAL DAILY
Qty: 90 TABLET | Refills: 3 | OUTPATIENT
Start: 2024-04-17

## 2024-04-17 RX ORDER — LISINOPRIL 5 MG/1
5 TABLET ORAL DAILY
Qty: 90 TABLET | Refills: 3 | OUTPATIENT
Start: 2024-04-17

## 2024-04-17 RX ORDER — SPIRONOLACTONE 25 MG/1
25 TABLET ORAL DAILY
Qty: 90 TABLET | Refills: 3 | Status: SHIPPED | OUTPATIENT
Start: 2024-04-17

## 2024-04-17 NOTE — TELEPHONE ENCOUNTER
Called and spoke with pt, states Entresto was previously recommended/ordered by Liliana Hui, but pt never picked up or started taking it. Med list updated to reflect what pt is currently taking, please sign pended med renewals if appropriate. Thank you.

## 2024-04-17 NOTE — TELEPHONE ENCOUNTER
He cant be on lisinopril and entresto at the same time.    Can you clarify what he is actually taking?  SDH

## 2024-04-18 DIAGNOSIS — E03.9 HYPOTHYROIDISM, UNSPECIFIED TYPE: Primary | ICD-10-CM

## 2024-04-18 RX ORDER — LEVOTHYROXINE SODIUM 100 UG/1
100 TABLET ORAL DAILY
Qty: 90 TABLET | Refills: 1 | Status: SHIPPED | OUTPATIENT
Start: 2024-04-18

## 2024-04-21 PROBLEM — K57.90 DIVERTICULAR DISEASE: Status: ACTIVE | Noted: 2024-04-21

## 2024-04-21 PROBLEM — I49.3 MULTIFOCAL PVCS: Status: ACTIVE | Noted: 2023-03-31

## 2024-04-21 PROBLEM — I42.9 CARDIOMYOPATHY (MULTI): Status: ACTIVE | Noted: 2023-03-31

## 2024-04-21 PROBLEM — R06.02 SHORTNESS OF BREATH: Status: ACTIVE | Noted: 2023-03-31

## 2024-04-21 PROBLEM — Z86.0100 HISTORY OF COLONIC POLYPS: Status: ACTIVE | Noted: 2024-04-21

## 2024-04-21 PROBLEM — I51.7 LAE (LEFT ATRIAL ENLARGEMENT): Status: ACTIVE | Noted: 2024-04-21

## 2024-04-21 PROBLEM — M25.562 KNEE PAIN, LEFT: Status: ACTIVE | Noted: 2024-04-21

## 2024-04-21 PROBLEM — M17.12 PRIMARY LOCALIZED OSTEOARTHRITIS OF LEFT KNEE: Status: ACTIVE | Noted: 2024-04-21

## 2024-04-21 PROBLEM — R73.9 HYPERGLYCEMIA: Status: ACTIVE | Noted: 2023-03-31

## 2024-04-21 PROBLEM — H61.21 IMPACTED CERUMEN OF RIGHT EAR: Status: ACTIVE | Noted: 2021-02-24

## 2024-04-21 PROBLEM — Z86.010 HISTORY OF COLONIC POLYPS: Status: ACTIVE | Noted: 2024-04-21

## 2024-04-21 PROBLEM — I35.0 NONRHEUMATIC AORTIC VALVE STENOSIS: Status: ACTIVE | Noted: 2024-04-21

## 2024-04-21 ASSESSMENT — ACTIVITIES OF DAILY LIVING (ADL)
TAKING_MEDICATION: INDEPENDENT
MANAGING_FINANCES: INDEPENDENT
DOING_HOUSEWORK: INDEPENDENT
BATHING: INDEPENDENT
DRESSING: INDEPENDENT
GROCERY_SHOPPING: INDEPENDENT

## 2024-04-21 ASSESSMENT — PATIENT HEALTH QUESTIONNAIRE - PHQ9
2. FEELING DOWN, DEPRESSED OR HOPELESS: SEVERAL DAYS
1. LITTLE INTEREST OR PLEASURE IN DOING THINGS: NOT AT ALL
10. IF YOU CHECKED OFF ANY PROBLEMS, HOW DIFFICULT HAVE THESE PROBLEMS MADE IT FOR YOU TO DO YOUR WORK, TAKE CARE OF THINGS AT HOME, OR GET ALONG WITH OTHER PEOPLE: SOMEWHAT DIFFICULT
SUM OF ALL RESPONSES TO PHQ9 QUESTIONS 1 AND 2: 1

## 2024-10-12 DIAGNOSIS — E03.9 HYPOTHYROIDISM, UNSPECIFIED TYPE: ICD-10-CM

## 2024-10-14 RX ORDER — LEVOTHYROXINE SODIUM 100 UG/1
100 TABLET ORAL DAILY
Qty: 90 TABLET | Refills: 1 | Status: SHIPPED | OUTPATIENT
Start: 2024-10-14

## 2025-01-26 DIAGNOSIS — E03.9 HYPOTHYROIDISM, UNSPECIFIED TYPE: ICD-10-CM

## 2025-01-28 RX ORDER — LEVOTHYROXINE SODIUM 100 UG/1
100 TABLET ORAL DAILY
Qty: 90 TABLET | Refills: 1 | Status: SHIPPED | OUTPATIENT
Start: 2025-01-28

## 2025-04-04 DIAGNOSIS — I50.22 SYSTOLIC HEART FAILURE, CHRONIC: ICD-10-CM

## 2025-04-04 DIAGNOSIS — I42.0 DILATED CARDIOMYOPATHY (MULTI): ICD-10-CM

## 2025-04-04 RX ORDER — LISINOPRIL 5 MG/1
5 TABLET ORAL DAILY
Qty: 90 TABLET | Refills: 3 | Status: SHIPPED | OUTPATIENT
Start: 2025-04-04

## 2025-04-07 ENCOUNTER — PATIENT MESSAGE (OUTPATIENT)
Dept: PRIMARY CARE | Facility: CLINIC | Age: 75
End: 2025-04-07
Payer: MEDICARE

## 2025-04-07 DIAGNOSIS — R73.9 HYPERGLYCEMIA: Primary | ICD-10-CM

## 2025-04-07 DIAGNOSIS — R00.2 PALPITATIONS: ICD-10-CM

## 2025-04-07 DIAGNOSIS — R74.8 ELEVATED LIVER ENZYMES: ICD-10-CM

## 2025-04-07 DIAGNOSIS — R09.89 LABILE BLOOD PRESSURE: ICD-10-CM

## 2025-04-07 DIAGNOSIS — Z00.00 ROUTINE GENERAL MEDICAL EXAMINATION AT A HEALTH CARE FACILITY: ICD-10-CM

## 2025-04-07 DIAGNOSIS — E78.89 ELEVATED HIGH-DENSITY LIPOPROTEIN: ICD-10-CM

## 2025-04-07 DIAGNOSIS — E03.9 HYPOTHYROIDISM, UNSPECIFIED TYPE: ICD-10-CM

## 2025-04-08 DIAGNOSIS — I50.22 SYSTOLIC HEART FAILURE, CHRONIC: ICD-10-CM

## 2025-04-08 DIAGNOSIS — I42.0 DILATED CARDIOMYOPATHY (MULTI): ICD-10-CM

## 2025-04-08 RX ORDER — CARVEDILOL 12.5 MG/1
12.5 TABLET ORAL 2 TIMES DAILY
Qty: 180 TABLET | Refills: 1 | Status: SHIPPED | OUTPATIENT
Start: 2025-04-08

## 2025-04-08 RX ORDER — SPIRONOLACTONE 25 MG/1
25 TABLET ORAL DAILY
Qty: 90 TABLET | Refills: 1 | Status: SHIPPED | OUTPATIENT
Start: 2025-04-08

## 2025-04-12 LAB
ALBUMIN SERPL-MCNC: 4 G/DL (ref 3.6–5.1)
ALT SERPL-CCNC: 20 U/L (ref 9–46)
APPEARANCE UR: CLEAR
AST SERPL-CCNC: 19 U/L (ref 10–35)
BASOPHILS # BLD AUTO: 41 CELLS/UL (ref 0–200)
BASOPHILS NFR BLD AUTO: 1.1 %
BILIRUB UR QL STRIP: NEGATIVE
BUN SERPL-MCNC: 12 MG/DL (ref 7–25)
BUN/CREAT SERPL: ABNORMAL (CALC) (ref 6–22)
CALCIUM SERPL-MCNC: 9 MG/DL (ref 8.6–10.3)
CHLORIDE SERPL-SCNC: 104 MMOL/L (ref 98–110)
CHOLEST SERPL-MCNC: 249 MG/DL
CHOLEST/HDLC SERPL: 4.1 (CALC)
CO2 SERPL-SCNC: 24 MMOL/L (ref 20–32)
COLOR UR: YELLOW
CREAT SERPL-MCNC: 0.76 MG/DL (ref 0.7–1.28)
EGFRCR SERPLBLD CKD-EPI 2021: 94 ML/MIN/1.73M2
EOSINOPHIL # BLD AUTO: 100 CELLS/UL (ref 15–500)
EOSINOPHIL NFR BLD AUTO: 2.7 %
ERYTHROCYTE [DISTWIDTH] IN BLOOD BY AUTOMATED COUNT: 12.8 % (ref 11–15)
EST. AVERAGE GLUCOSE BLD GHB EST-MCNC: 108 MG/DL
EST. AVERAGE GLUCOSE BLD GHB EST-SCNC: 6 MMOL/L
GLUCOSE SERPL-MCNC: 114 MG/DL (ref 65–99)
GLUCOSE UR QL STRIP: NEGATIVE
HBA1C MFR BLD: 5.4 % OF TOTAL HGB
HCT VFR BLD AUTO: 42.9 % (ref 38.5–50)
HDLC SERPL-MCNC: 61 MG/DL
HGB BLD-MCNC: 14.3 G/DL (ref 13.2–17.1)
HGB UR QL STRIP: NEGATIVE
KETONES UR QL STRIP: NEGATIVE
LDLC SERPL CALC-MCNC: 166 MG/DL (CALC)
LEUKOCYTE ESTERASE UR QL STRIP: NEGATIVE
LYMPHOCYTES # BLD AUTO: 1310 CELLS/UL (ref 850–3900)
LYMPHOCYTES NFR BLD AUTO: 35.4 %
MCH RBC QN AUTO: 32.1 PG (ref 27–33)
MCHC RBC AUTO-ENTMCNC: 33.3 G/DL (ref 32–36)
MCV RBC AUTO: 96.4 FL (ref 80–100)
MONOCYTES # BLD AUTO: 440 CELLS/UL (ref 200–950)
MONOCYTES NFR BLD AUTO: 11.9 %
NEUTROPHILS # BLD AUTO: 1809 CELLS/UL (ref 1500–7800)
NEUTROPHILS NFR BLD AUTO: 48.9 %
NITRITE UR QL STRIP: NEGATIVE
NONHDLC SERPL-MCNC: 188 MG/DL (CALC)
PH UR STRIP: 8.5 [PH] (ref 5–8)
PHOSPHATE SERPL-MCNC: 3.3 MG/DL (ref 2.1–4.3)
PLATELET # BLD AUTO: 143 THOUSAND/UL (ref 140–400)
PMV BLD REES-ECKER: 9 FL (ref 7.5–12.5)
POTASSIUM SERPL-SCNC: 4.7 MMOL/L (ref 3.5–5.3)
PROT UR QL STRIP: NEGATIVE
RBC # BLD AUTO: 4.45 MILLION/UL (ref 4.2–5.8)
SODIUM SERPL-SCNC: 136 MMOL/L (ref 135–146)
SP GR UR STRIP: 1.01 (ref 1–1.03)
TRIGL SERPL-MCNC: 106 MG/DL
TSH SERPL-ACNC: 0.78 MIU/L (ref 0.4–4.5)
WBC # BLD AUTO: 3.7 THOUSAND/UL (ref 3.8–10.8)

## 2025-04-15 ENCOUNTER — APPOINTMENT (OUTPATIENT)
Dept: PRIMARY CARE | Facility: CLINIC | Age: 75
End: 2025-04-15
Payer: MEDICARE

## 2025-04-15 VITALS
DIASTOLIC BLOOD PRESSURE: 70 MMHG | SYSTOLIC BLOOD PRESSURE: 120 MMHG | OXYGEN SATURATION: 96 % | WEIGHT: 182 LBS | HEART RATE: 64 BPM | HEIGHT: 67 IN | BODY MASS INDEX: 28.56 KG/M2

## 2025-04-15 DIAGNOSIS — I35.0 NONRHEUMATIC AORTIC VALVE STENOSIS: ICD-10-CM

## 2025-04-15 DIAGNOSIS — I34.0 NONRHEUMATIC MITRAL VALVE REGURGITATION: ICD-10-CM

## 2025-04-15 DIAGNOSIS — I42.9 CARDIOMYOPATHY, UNSPECIFIED TYPE (MULTI): ICD-10-CM

## 2025-04-15 DIAGNOSIS — D12.6 TUBULAR ADENOMA OF COLON: ICD-10-CM

## 2025-04-15 DIAGNOSIS — E03.9 HYPOTHYROIDISM, UNSPECIFIED TYPE: ICD-10-CM

## 2025-04-15 DIAGNOSIS — I51.7 LAE (LEFT ATRIAL ENLARGEMENT): ICD-10-CM

## 2025-04-15 DIAGNOSIS — Z00.00 ROUTINE GENERAL MEDICAL EXAMINATION AT HEALTH CARE FACILITY: Primary | ICD-10-CM

## 2025-04-15 DIAGNOSIS — E78.89 ELEVATED HIGH-DENSITY LIPOPROTEIN: ICD-10-CM

## 2025-04-15 PROCEDURE — 93000 ELECTROCARDIOGRAM COMPLETE: CPT | Performed by: INTERNAL MEDICINE

## 2025-04-15 ASSESSMENT — ENCOUNTER SYMPTOMS
DEPRESSION: 0
LOSS OF SENSATION IN FEET: 0
OCCASIONAL FEELINGS OF UNSTEADINESS: 0

## 2025-04-15 NOTE — PROGRESS NOTES
"Subjective   Reason for Visit: Wei Fitzgerald is an 74 y.o. male here for a Medicare Wellness visit.          Reviewed all medications by prescribing practitioner or clinical pharmacist (such as prescriptions, OTCs, herbal therapies and supplements) and documented in the medical record.    HPI  Weight gain 10#    bP 100/60    [place] home     3-3.5 mi  Sleep beeter longer   Vodka   Diet         xxxxxxxxxxxxxxxxx  Candle burning down low energy   Normal daily activities   Depressed domestic/family ;  financial support   Friends loss death   100-120   no family history to report   [facility] visit 3/27/2022 and 4/6/2023    2-5 miles walking day   Sleep trouble falling reading consistent early awakening   Usual once   Diet -same     Dental OK \  Hearing ok no change   Eye due   12/22/21 pf c19  L knee Ptx  Nt in hands bed positional activity     Etoh 4 oz    Family History:   Heart Disease, Cancer+, Hypertension, Diabetes mellitus, Hyperlipidemia, Asthma,  Alcoholism or substance abuse, Depression/Suicide, Glaucoma   Mother dead 80s , natural causes   Father dead 76 prostate cancer    Brother(s) no   Sisters(s) no    Son(s) no   Daughter(s)no      Social History:   Birthplace: [place] Education:3 yr college  :no   Marital Status:m x 33        Occupation: retired     Travel/Illness: [place] , [place]    Exposures (asbestos, radiation, radon, noise, etc.): thyroid    Lifestyle:    Diet limited fried food , low red meat , well balanced     Exercise + walks 2-4 miles daily     Ear protection from loud noise     Eye protection from projectiles (work, hobbies, sports)    Alcohol + daily vodka same 1.25 oz 3 - no health legal or social problems    Drugs -    Tobacco former , none x 21 yrs , 1-2 ppd x 5-10 yrs    Caffeine + coffee    Safety: guns-, seatbelts/airbags+30%, smoke detectors+    Sexual - aware of sexually transmitted disease and \"safe\" sexual practice "     Skin protection from sun +    Swim +    Living Will/Durable Power of  for Healthcare +    Cardiopulmonary resuscitation (CPR) training    Blood + or organ donor / Blood type     Patient Care Team:  Stan Perales MD as PCP - General  Stan Perales MD as PCP - [facility] Attributed Provider  Ramon Mendez MD as Consulting Physician (Cardiology)     Review of Systems  Weight Current 165 # Maximum  170s Desired  Change  In the past 6-12 weeks, have you experienced any significant ... Negative except for noted   Fever, chills, Night sweats, fatigue +, change in appetite, change in sleep-8 hours early awakening   Skin sore, rashes, itching, moles of concern-   Swollen or tender glands in neck, armpits, or groin   Unusually severe or prolonged headaches   Blurred, double, or loss of vision; last formal eye exam, with whom, reading glasses+/contacts-2022+   Loss or change in hearing, ear pain, ringing+constant buzz, or wax problem, hearing aid-right buzz   Nasal congestion, polyps, runny nose, sneezing   Sores in mouth, sore throat, change or trouble in chewing, speaking, swallowing, or voice   Dental or jaw problem, dentures, regular dental care with whom+ regular     Neck or back pain; swelling, redness, pain, or stiffness in joints; known arthritis   Chest pain/pressure or unusual shortness of breath with exertion, shortness of breath while lying down, swelling in legs, sensation of heart racing or skipping beats; unexplained nausea, or breaking into a cold sweat; feeling faint, lightheaded, or dizzy Unusual cough   Stomach or abdominal pain, nausea/vomiting, diarrhea/constipation, blood in or  dark black color to stool, other change in bowel habits-   Burning on urination, trouble getting stream started, frequent or nighttime urination+, blood in urine, loss of control of bladder; sex ok    Lumps or thickening in testicles or scrotum, sores or discharge from penis, swelling or hernias in groin   Weakness,  "numbness/tingling, especially on one side of the body or the other;  tremor, shakiness, loss of coordination, or falls; change in memory   Excessive or change in thirst, urination, or appetite; unusual sensitivity to the heat or cold   Unusual feelings of being blue, sad, down or depressed; stress, restlessness, tension or anxiety  Falls -   Sastifactory hands fall asleep in bed   3x week tumeric     Objective   Vitals:  /70 (BP Location: Left arm, BP Cuff Size: Large adult)   Pulse 64   Ht 1.702 m (5' 7\")   Wt 82.6 kg (182 lb)   SpO2 96%   BMI 28.51 kg/m²       Physical Exam    General: well-developed, well-nourished middle-aged ambulatory white male in no acute distress  Head: normocephalic/atraumatic; temporal arteries intact, nontender; Temporalmandibular joints nontender without click  Eyes: EOMI, PERRLA, sclera white, conjunctiva pink, fundi not examined  Ears: Canals clear; TMs clear intact with normal landmarks, hearing intact to speech and finger rub; external surgical scar right ear; ear nontender  Nose: Nasal mucosa clear, pink, moist; no sinus tenderness  Oropharynx: Mucosa clear, pink, moist without lesions or exudate   Dentition intact good/ fair condition/repair, few missing teeth; right posterior upper tooth impaired   Tonsils atrophy/absent   Tongue midline, normal mucosa and protrusion   Normal motion of palate/uvula/pharynx  Neck: Supple, full range of motion; no lymphadenopathy; no JVD   Thyroid normal size, smooth to palpation/observation without nodules   Carotids normal pulse, upstroke without bruit  Back: Spine normal shape, curvature, flexibility, nontender   No CVA or SIJ tenderness  Chest: Normal symmetrical, full expansion with equal breath sounds without adventitious sounds; wall nontender  Cor: PMI normal; regular rate and rhythm; normal S1, S2 without adventitious sounds  Abdomen: Soft, nontender, benign without mass, hepatosplenomegaly, fluid   Bowels sounds present, " normal  Genitourinary: Normal male phallus without lesion or exudate   Scrotum without masses; testes smooth, nontender   Inguinal canal without mass  Rectal: Normal sphincter tone; no mass; stool formed, soft, brown, hemoccult negative   Prostate smooth, normal size, nontender, normal consistency  Extremities: Bones, Joints intact with FROM, T0 L0 S0   Pulses intact, symmetrical; no edema; venous system legs normal  Neurological: Mental status - alert, appropriate affect, normal orientation and conversational interaction; right handed dominant   Cranial Nerves: II-XII intact   Motor - 5/5 strength throughout muscle groups; normal tone, bulk   Sensory - intact symmetrical soft touch, sharp sense   DTRs - intact knee, ankle, brachial   Cerebellar - normal finger-nose; absent Rhomberg sign   Station/gait - intact, stable including toe, heel, and tandem   Integument: Skin - clear without rash or lesion, port-wine stain left forearm   Lymph - without cervical, axillary, or inguinal lymphadenopathy      Results/Data    Laboratory 10/30/2023 Free T4 =1.65, TSH 0.22  , HDL-C 64, ratio 3.6, LDL-C 146, glucose 112, creatinine 0.7 with GFR greater than 90  3/14/2022 satisfactory  January 2021 reviewed summary 2019 reviewed  December 2018 satisfactory with borderline high LDL cholesterol, high HDL cholesterol  PSA normal and less than last year  Coronary artery calcium CT score near 0 in 2017  Abdominal ultrasound - October 2017 for aneurysm aorta, 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  Laboratory October 2017 satisfactory with high HDL cholesterol  Cardiology consult 3/22/2024 reviewed  Orthopedic consult 5/24/2023 reviewed  Electrocardiogram sinus bradycardia/normal sinus rhythm 4/8/2024  normal sinus rhythm with isolated uniform PVCs 3/17/22  Echocardiogram 8/23/2023-LVEF 45%, left atrium severely dilated, mild aortic valve stenosis; LV function has improved from 2/7/2023 from 30-35%  X-ray left knee 5/15/2023 mild  degenerative changes  Colonoscopy October 2017 diverticulosis and colon polyps including tubular adenoma  Stress echocardiogram 2005 normal      Assessment/Plan   1 overall you are in good health today, age and gender appropriate wellness services reviewed  2 you have no clinical evidence of active heart disease by history exam with indications of left atrial enlargement, cardiomyopathy or weakened heart muscle, aortic valve stenosis, and premature ventricular contractions  Entresto for afterload reduction  Consider cholesterol therapy  Continue current therapy and cardiology follow-up reviewed  Cardiac risk counseling  3 age and gender appropriate cancer screening and prevention reviewed, recommend follow-up colonoscopy  4 Immunizations available reviewed, declined  5 hypothyroidism-recheck levels    Problem List Items Addressed This Visit       Cardiomyopathy    Relevant Orders    ECG 12 lead (Clinic Performed)    Elevated high-density lipoprotein    Hypothyroid    Nonrheumatic mitral valve regurgitation    Tubular adenoma of colon    Routine general medical examination at health care facility - Primary    LAE (left atrial enlargement)    Nonrheumatic aortic valve stenosis

## 2025-04-15 NOTE — PATIENT INSTRUCTIONS
1 overall you are in good health today, age and gender appropriate wellness services reviewed  2 you have no clinical evidence of active heart disease by history exam with indications of left atrial enlargement, cardiomyopathy or weakened heart muscle, aortic valve stenosis, and premature ventricular contractions  Electrocardiogram sinus bradycardia, slow natural pacemaker rhythm indicative efficient heart function, and right bundle branch block, stable slight reduction in the electrical signal to the right side of the heart  Lisinopril for afterload reduction  Echocardiogram with improved heart function  Continue current therapy and cardiology follow-up reviewed  Cardiac risk counseling  3 age and gender appropriate cancer screening and prevention reviewed, recommend follow-up colonoscopy last 2017  4 Immunizations available reviewed, declined  5 hypothyroidism-satisfactory  6 weight gain-pound of fat 3500 cyrus, decrease caloric intake 500 cyrus daily, increase exercise

## 2025-08-11 DIAGNOSIS — E03.9 HYPOTHYROIDISM, UNSPECIFIED TYPE: ICD-10-CM

## 2025-08-11 RX ORDER — LEVOTHYROXINE SODIUM 100 UG/1
100 TABLET ORAL DAILY
Qty: 90 TABLET | Refills: 1 | Status: SHIPPED | OUTPATIENT
Start: 2025-08-11